# Patient Record
Sex: MALE | URBAN - METROPOLITAN AREA
[De-identification: names, ages, dates, MRNs, and addresses within clinical notes are randomized per-mention and may not be internally consistent; named-entity substitution may affect disease eponyms.]

---

## 2023-04-05 ENCOUNTER — TELEPHONE ENCOUNTER (OUTPATIENT)
Dept: URBAN - METROPOLITAN AREA CLINIC 107 | Facility: CLINIC | Age: 33
End: 2023-04-05

## 2023-05-09 ENCOUNTER — DASHBOARD ENCOUNTERS (OUTPATIENT)
Age: 33
End: 2023-05-09

## 2023-05-09 ENCOUNTER — OFFICE VISIT (OUTPATIENT)
Dept: URBAN - METROPOLITAN AREA CLINIC 107 | Facility: CLINIC | Age: 33
End: 2023-05-09
Payer: COMMERCIAL

## 2023-05-09 ENCOUNTER — WEB ENCOUNTER (OUTPATIENT)
Dept: URBAN - METROPOLITAN AREA CLINIC 107 | Facility: CLINIC | Age: 33
End: 2023-05-09

## 2023-05-09 VITALS
HEIGHT: 66 IN | RESPIRATION RATE: 18 BRPM | TEMPERATURE: 96.9 F | BODY MASS INDEX: 21.38 KG/M2 | HEART RATE: 82 BPM | DIASTOLIC BLOOD PRESSURE: 77 MMHG | SYSTOLIC BLOOD PRESSURE: 106 MMHG | WEIGHT: 133 LBS

## 2023-05-09 DIAGNOSIS — K58.0 IRRITABLE BOWEL SYNDROME WITH DIARRHEA: ICD-10-CM

## 2023-05-09 PROBLEM — 197125005: Status: ACTIVE | Noted: 2023-05-09

## 2023-05-09 PROCEDURE — 99204 OFFICE O/P NEW MOD 45 MIN: CPT | Performed by: STUDENT IN AN ORGANIZED HEALTH CARE EDUCATION/TRAINING PROGRAM

## 2023-05-09 NOTE — HPI-TODAY'S VISIT:
Initial visit 5/9/2023; daughter Dasha Stewart, TOY Mr. Dugan is a 32-year-old male with no significant medical history who was referred to the GI clinic for evaluation of intermittent episodes of diarrhea. Labs 8/31/2021, WBC 6.4, hemoglobin 14.3, platelet 182, BUN 12, creatinine 0.85, liver enzymes unremarkable, total IgA 113 [normal], anti-tTG IgA negative, anti-Endomysial IgA negative, TSH within normal limit. H. pylori testing negative.  Patient complains of a 1.5 year history of intermittent loose stools. He has noticed an association with dairy products however even after cutting out dairy he still noticed soft/loose stools. He uses lactaid when ingesting dairy. He does ingest alot of butter which may also be a trigger for him. He has no significant weight loss. No abdominal pain, nauea/vomiting, fevers/chills. No prior EGD/colonoscopy. He denies rectal bleeding. No family history of colon cancer. He has a family history of celiac disease. He states that his symptoms have actually resolved over the last one month. 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 He also recently had his child in the hospital with a severe illness.